# Patient Record
Sex: FEMALE | URBAN - METROPOLITAN AREA
[De-identification: names, ages, dates, MRNs, and addresses within clinical notes are randomized per-mention and may not be internally consistent; named-entity substitution may affect disease eponyms.]

---

## 2024-04-25 ENCOUNTER — OV NP (OUTPATIENT)
Dept: URBAN - METROPOLITAN AREA CLINIC 68 | Facility: CLINIC | Age: 44
End: 2024-04-25
Payer: COMMERCIAL

## 2024-04-25 ENCOUNTER — OV NP (OUTPATIENT)
Dept: URBAN - METROPOLITAN AREA CLINIC 68 | Facility: CLINIC | Age: 44
End: 2024-04-25

## 2024-04-25 VITALS
SYSTOLIC BLOOD PRESSURE: 128 MMHG | HEIGHT: 68 IN | WEIGHT: 194 LBS | BODY MASS INDEX: 29.4 KG/M2 | DIASTOLIC BLOOD PRESSURE: 86 MMHG

## 2024-04-25 DIAGNOSIS — K64.4 ANAL SKIN TAG: ICD-10-CM

## 2024-04-25 DIAGNOSIS — R15.1 FECAL SOILING: ICD-10-CM

## 2024-04-25 PROBLEM — 225593006: Status: ACTIVE | Noted: 2024-04-25

## 2024-04-25 PROBLEM — 195469007: Status: ACTIVE | Noted: 2024-04-25

## 2024-04-25 PROCEDURE — 99204 OFFICE O/P NEW MOD 45 MIN: CPT | Performed by: INTERNAL MEDICINE

## 2024-04-25 RX ORDER — ALBUTEROL SULFATE 90 UG/1
1 PUFF AS NEEDED AEROSOL, METERED RESPIRATORY (INHALATION)
Status: ACTIVE | COMMUNITY

## 2024-04-25 NOTE — HPI-TODAY'S VISIT:
Case of a 43-year-old female patient that comes in today for evaluation.  Upon interrogation she refers she has been having hemorrhoidal issues.  She feels a lump in her perianal region.  Furthermore she has been having episodes of fecal soiling on the underwear.  She denies rectal bleeding.  Denies rectal pain.  Denies diarrhea or constipation.  Denies abdominal pain.  Denies changes in bowel habits or weight loss.

## 2024-06-26 ENCOUNTER — OFFICE VISIT (OUTPATIENT)
Dept: URBAN - METROPOLITAN AREA CLINIC 68 | Facility: CLINIC | Age: 44
End: 2024-06-26
Payer: COMMERCIAL

## 2024-06-26 ENCOUNTER — DASHBOARD ENCOUNTERS (OUTPATIENT)
Age: 44
End: 2024-06-26

## 2024-06-26 VITALS — BODY MASS INDEX: 29.4 KG/M2 | WEIGHT: 194 LBS | HEIGHT: 68 IN

## 2024-06-26 DIAGNOSIS — Z12.11 COLON CANCER SCREENING: ICD-10-CM

## 2024-06-26 PROBLEM — 305058001: Status: ACTIVE | Noted: 2024-06-26

## 2024-06-26 PROCEDURE — 99212 OFFICE O/P EST SF 10 MIN: CPT | Performed by: INTERNAL MEDICINE

## 2024-06-26 RX ORDER — POLYETHYLENE GLYCOL 3350, SODIUM SULFATE, SODIUM CHLORIDE, POTASSIUM CHLORIDE, ASCORBIC ACID, SODIUM ASCORBATE 140-9-5.2G
500ML KIT ORAL ONCE
Qty: 1000 ML | Refills: 0 | OUTPATIENT
Start: 2024-06-26 | End: 2024-06-27

## 2024-06-26 RX ORDER — ALBUTEROL SULFATE 90 UG/1
1 PUFF AS NEEDED AEROSOL, METERED RESPIRATORY (INHALATION)
Status: ACTIVE | COMMUNITY

## 2024-06-26 NOTE — HPI-TODAY'S VISIT:
Case of a  44YOF that comes in today for colonoscopy consult. Upon questioning she stated feeling well without acute complaints. Furthermore she denied episodes of neither bright red blood per rectum (hematochezia) nor black/tarry stools (melena). She also denied any unexplained significant weight loss, frequent abdominal pain/distention, nausea, vomits, early satiety, tenesmus, rectal pain and changes in bowel habits or stool caliber. She did not complain of episodes of diarrhea interspersed with constipation.

## 2024-07-03 ENCOUNTER — LAB OUTSIDE AN ENCOUNTER (OUTPATIENT)
Dept: URBAN - METROPOLITAN AREA CLINIC 68 | Facility: CLINIC | Age: 44
End: 2024-07-03

## 2024-07-11 ENCOUNTER — OFFICE VISIT (OUTPATIENT)
Dept: URBAN - METROPOLITAN AREA SURGERY CENTER 12 | Facility: SURGERY CENTER | Age: 44
End: 2024-07-11